# Patient Record
Sex: MALE | Race: WHITE | Employment: UNEMPLOYED | URBAN - NONMETROPOLITAN AREA
[De-identification: names, ages, dates, MRNs, and addresses within clinical notes are randomized per-mention and may not be internally consistent; named-entity substitution may affect disease eponyms.]

---

## 2017-04-06 ENCOUNTER — OFFICE VISIT (OUTPATIENT)
Dept: PULMONOLOGY | Age: 62
End: 2017-04-06

## 2017-04-06 VITALS
TEMPERATURE: 97.4 F | HEART RATE: 72 BPM | DIASTOLIC BLOOD PRESSURE: 64 MMHG | WEIGHT: 299 LBS | SYSTOLIC BLOOD PRESSURE: 128 MMHG | BODY MASS INDEX: 36.41 KG/M2 | OXYGEN SATURATION: 97 % | RESPIRATION RATE: 18 BRPM | HEIGHT: 76 IN

## 2017-04-06 DIAGNOSIS — G47.10 HYPERSOMNIA: ICD-10-CM

## 2017-04-06 DIAGNOSIS — G47.33 OSA TREATED WITH BIPAP: ICD-10-CM

## 2017-04-06 DIAGNOSIS — J44.9 COPD, SEVERE (HCC): Primary | ICD-10-CM

## 2017-04-06 DIAGNOSIS — R91.1 LUNG NODULE: ICD-10-CM

## 2017-04-06 PROBLEM — G47.30 SLEEP APNEA: Status: ACTIVE | Noted: 2017-04-06

## 2017-04-06 PROCEDURE — 99215 OFFICE O/P EST HI 40 MIN: CPT | Performed by: INTERNAL MEDICINE

## 2017-04-06 RX ORDER — METOPROLOL SUCCINATE 100 MG/1
100 TABLET, EXTENDED RELEASE ORAL DAILY
COMMUNITY

## 2017-07-11 DIAGNOSIS — J44.9 COPD, SEVERE (HCC): ICD-10-CM

## 2017-07-11 DIAGNOSIS — R93.89 ABNORMAL CT SCAN, CHEST: ICD-10-CM

## 2017-07-11 DIAGNOSIS — R91.1 INCIDENTAL LUNG NODULE, GREATER THAN OR EQUAL TO 8MM: ICD-10-CM

## 2017-07-20 ENCOUNTER — OFFICE VISIT (OUTPATIENT)
Dept: PULMONOLOGY | Age: 62
End: 2017-07-20
Payer: COMMERCIAL

## 2017-07-20 VITALS
HEART RATE: 94 BPM | SYSTOLIC BLOOD PRESSURE: 116 MMHG | HEIGHT: 76 IN | OXYGEN SATURATION: 97 % | BODY MASS INDEX: 38.36 KG/M2 | DIASTOLIC BLOOD PRESSURE: 70 MMHG | WEIGHT: 315 LBS | TEMPERATURE: 98.2 F | RESPIRATION RATE: 19 BRPM

## 2017-07-20 DIAGNOSIS — J44.9 STAGE 3 SEVERE COPD BY GOLD CLASSIFICATION (HCC): Primary | ICD-10-CM

## 2017-07-20 DIAGNOSIS — Z72.0 TOBACCO ABUSE: ICD-10-CM

## 2017-07-20 PROCEDURE — 99214 OFFICE O/P EST MOD 30 MIN: CPT | Performed by: INTERNAL MEDICINE

## 2017-07-20 RX ORDER — LOSARTAN POTASSIUM 50 MG/1
50 TABLET ORAL DAILY
COMMUNITY

## 2017-07-20 RX ORDER — ATORVASTATIN CALCIUM 10 MG/1
10 TABLET, FILM COATED ORAL DAILY
COMMUNITY

## 2018-03-22 ENCOUNTER — OFFICE VISIT (OUTPATIENT)
Dept: PULMONOLOGY | Age: 63
End: 2018-03-22
Payer: COMMERCIAL

## 2018-03-22 VITALS
WEIGHT: 315 LBS | OXYGEN SATURATION: 98 % | BODY MASS INDEX: 38.36 KG/M2 | TEMPERATURE: 97.8 F | SYSTOLIC BLOOD PRESSURE: 118 MMHG | HEART RATE: 85 BPM | DIASTOLIC BLOOD PRESSURE: 72 MMHG | HEIGHT: 76 IN

## 2018-03-22 DIAGNOSIS — J44.9 STAGE 3 SEVERE COPD BY GOLD CLASSIFICATION (HCC): ICD-10-CM

## 2018-03-22 DIAGNOSIS — J44.9 STAGE 3 SEVERE COPD BY GOLD CLASSIFICATION (HCC): Primary | ICD-10-CM

## 2018-03-22 PROCEDURE — 3017F COLORECTAL CA SCREEN DOC REV: CPT | Performed by: INTERNAL MEDICINE

## 2018-03-22 PROCEDURE — G8926 SPIRO NO PERF OR DOC: HCPCS | Performed by: INTERNAL MEDICINE

## 2018-03-22 PROCEDURE — 99999 6 MIN WALK TEST: CPT | Performed by: INTERNAL MEDICINE

## 2018-03-22 PROCEDURE — G8428 CUR MEDS NOT DOCUMENT: HCPCS | Performed by: INTERNAL MEDICINE

## 2018-03-22 PROCEDURE — 3023F SPIROM DOC REV: CPT | Performed by: INTERNAL MEDICINE

## 2018-03-22 PROCEDURE — 94618 PULMONARY STRESS TESTING: CPT | Performed by: INTERNAL MEDICINE

## 2018-03-22 PROCEDURE — G8417 CALC BMI ABV UP PARAM F/U: HCPCS | Performed by: INTERNAL MEDICINE

## 2018-03-22 PROCEDURE — 99214 OFFICE O/P EST MOD 30 MIN: CPT | Performed by: INTERNAL MEDICINE

## 2018-03-22 PROCEDURE — 1036F TOBACCO NON-USER: CPT | Performed by: INTERNAL MEDICINE

## 2018-03-22 PROCEDURE — G8484 FLU IMMUNIZE NO ADMIN: HCPCS | Performed by: INTERNAL MEDICINE

## 2018-03-22 NOTE — PROGRESS NOTES
daily      losartan (COZAAR) 50 MG tablet Take 50 mg by mouth daily      metoprolol succinate (TOPROL XL) 100 MG extended release tablet Take 100 mg by mouth daily      metFORMIN (GLUCOPHAGE) 500 MG tablet Take 500 mg by mouth 2 times daily (with meals)      IRON PO Take by mouth      SYMBICORT 160-4.5 MCG/ACT AERO INHALE TWO PUFFS BY MOUTH INTO THE LUNGS TWICE DAILY 1 Inhaler 11    dabigatran (PRADAXA) 150 MG capsule Take 150 mg by mouth 2 times daily      omeprazole (PRILOSEC) 20 MG capsule Take 20 mg by mouth daily      diltiazem (CARDIZEM CD) 120 MG ER capsule Take 240 mg by mouth 2 times daily       magnesium oxide (MAG-OX) 400 MG tablet Take 400 mg by mouth daily      gabapentin (NEURONTIN) 100 MG capsule Take 100 mg by mouth 3 times daily      OXYGEN Inhale 2-4 L into the lungs daily 2L during am, 4L qhs      Roflumilast (DALIRESP) 500 MCG tablet Take 500 mcg by mouth daily 30 tablet 11    tiotropium (SPIRIVA) 18 MCG inhalation capsule Inhale 18 mcg into the lungs daily      albuterol sulfate  (90 BASE) MCG/ACT inhaler Inhale 2 puffs into the lungs every 6 hours as needed for Wheezing      buPROPion (WELLBUTRIN) 100 MG tablet Take 100 mg by mouth 2 times daily       No current facility-administered medications for this visit. No family history on file. Physical Exam     VITALS:    /72   Pulse 85   Temp 97.8 °F (36.6 °C)   Ht 6' 4\" (1.93 m)   Wt (!) 318 lb (144.2 kg)   SpO2 98% Comment: 4L/02 at rest  BMI 38.71 kg/m²   Mallampati airway Class:4  Neck Circumference:.19 Inches  [place] sleepiness score 3/22/18:5     Nursing note and vitals reviewed. Constitutional: Patient appears well built and well nourished. No distress. Patient is oriented to person, place, and time. HENT:   Head: Normocephalic and atraumatic. Right Ear: External ear normal.   Left Ear: External ear normal.   Mouth/Throat: Oropharynx is clear and moist.  No oral thrush.   Eyes: Conjunctivae

## 2018-06-26 DIAGNOSIS — J44.9 STAGE 3 SEVERE COPD BY GOLD CLASSIFICATION (HCC): ICD-10-CM

## 2018-06-26 DIAGNOSIS — Z72.0 TOBACCO ABUSE: ICD-10-CM

## 2018-07-24 NOTE — PROGRESS NOTES
for pulmonary rehab. He was referred to Dr. Joan Ferrer, CT surgeon at [facility] Cardiothoracic surgery department for further evaluation and management of his severe COPD with decreaing lung function for LVRS Vs lung transplant. He quit smoking 2years back. How ever he did not want to go any referral. He verbalizes understanding of consequences of his decision. Social History: Occupation: He used to work as a . He is currently not working      Patient admits tohistory of tobacco smoking. He used to smoke 2  PPD for 40 Years. He quit smoking in 06/26/15. He denies history of exposure to coal, foundry, [employer], asbestos or significant farm dust exposure. Patient is a . Patient denies history of exposure to birds, pigeons, or chickens in the past. The patient admits toto pet animals at home. Hecurrently had 3dogs, 1 [place] pig and 4 cats at home. He denies to history of recreational or IV drug use. Hedenies history of pulmonary embolism or DVT in the past.      Review of Systems   General/Constitutional: he gained 1lb of weight from the last visit with normal appetite. No fever or chills. HENT: Negative. Eyes: Negative. Upper respiratory tract: No nasal stuffiness or post nasal drip. Lower respiratory tract/ lungs: No cough or sputum production. No hemoptysis. Cardiovascular: No palpitations or chest pain. Gastrointestinal: No nausea or vomiting. Neurological: No focal neurologiacal weakness. Extremities: No edema. Musculoskeletal: No complaints. Genitourinary: No complaints. Hematological: Negative. Psychiatric/Behavioral: Negative. Skin: No itching.           Current Medications:          Past Medical History:   Diagnosis Date    Alkaline reflux esophagitis     Congenital hiatus hernia     [facility])     Disorder of lipoid metabolism     DM2 (diabetes mellitus, type 2) ([facility] [address]     Hypertension facility-administered medications for this visit. No family history on file. Physical Exam     VITALS:    /70 (Site: Left Arm, Position: Sitting, Cuff Size: Large Adult)   Pulse 72   Temp 97.2 °F (36.2 °C) Comment: Tympanic  Resp 24   Ht 6' 4\" (1.93 m)   Wt (!) 317 lb (143.8 kg)   SpO2 96% Comment: on 3 L  BMI 38.59 kg/m²   Mallampati airway Class:  IV  Neck Circumference:  19 Inches  [place] sleepiness score 7/26/18: 3. Constitutional: Patient appears well built and well nourished. No distress. Patient is oriented to person, place, and time. HENT:   Head: Normocephalic and atraumatic. Right Ear: External ear normal.   Left Ear: External ear normal.   Mouth/Throat: Oropharynx is clear and moist.  No oral thrush. Eyes: Conjunctivae are normal. Pupils are equal, round, and reactive to light. No scleral icterus. Neck: Neck supple. No JVD present. No tracheal deviation present. Cardiovascular: Normal rate, regular rhythm, normal heart sounds. No murmur heard. Pulmonary/Chest: Effort normal and breath sounds normal. No stridor. No respiratory distress. No wheezes. No rales. Patient exhibits no tenderness. Diminished breath sounds on both sides. Abdominal: Soft. Patient exhibits no distension. No tenderness. Musculoskeletal: Normal range of motion. Extremities: Patient exhibits no edema and no tenderness. Lymphadenopathy:  No cervical adenopathy. Neurological: Patient is alert and oriented to person, place, and time. Skin: Skin is warm and dry. Patient is not diaphoretic. Psychiatric: Patient  has a normal mood and affect. Patient behavior is normal.       Diagnostic Data:    Echocardiogram: 2016                    Nocturnal pulse ox study: 6/7/16                    CT chest:          Serum [id number] level: 12/29/15                PET scan:  Jul 11, 2016  PROCEDURE: PET CT SKULL BASE TO MID THIGH  1.  No significant metabolic activity in a left upper lobe subpleural density, likely nodular scarring. 2. Chronic lung disease. Split night sleep test:2016                   Spirometry 2017: 12/19/16                 His FEV1 improved from previous spirometry done in the past.       CT chest: 7/10/17             Six Minute Walk Test done on 3/22/18 at 10:21 AM    Clive Morris 1955    Six minute walk test done in my office today by my medical assistant Miss. Aparicio: Jeremiah's oxygen saturation at rest on room air was 93%. His oxygen saturation dropped to 84% on room air with exertion after 2 minute 0 seconds. The six minute walk test was repeated with oxygen supplementation. Oxygen supplimentation was started with 2LPM via nasal cannula and titrated to 3 LPM via nasal cannula. At the end of the test Jeremiah Loredo's oxygen saturation remained at 93% on 3LPM with exertion. He is mobile in the home and requires oxygen as outlined above. Clive Morris needs no home O2 at rest. He needs 3LPM of home O2 with exercise. DME Medical Necessity Documentation    Patient was seen in my clinic on 3/22/18 for the diagnosis COPD. I am prescribing oxygen because the diagnosis and testing requires the patient to have oxygen in the home. Condition will improve or be benefited by oxygen use. The patient is  able to perform good mobility in a home setting and therefore does require the use of a portable oxygen system. Diagnostic Data: 7-2-13  AHI 5  PAP Download:    Recorded compliance dates:  6-16-18 -  7-15-18  More than 4hour usage compliance was:  100%. Average residual Apnea- Hypoapnea index on current pressue was:0.5        PAP Type Bi PAP   Level  17/21.0 cmH2O     Average usuage hours per day was: 10 hrs 52 min 24 sec     Interface: FFM     Provider:  []-ANALISA                 []Carly                []Edward  [x]Kristi                               [][facility]          []Other:       CT chest 2018: He refused to have any more CT scans in future.  He is worried about having too much radiation exposure and associated risk. He is aware of the consequences if his decision including but not limited to missing/non diagnosis of neoplastic process and associated co morbidity leading to early death. Assessment:  -Severe COPD- under control with his current treatment.  -Severe obstructive sleep apnea on treatment with a BiPAP with pressures of 21/17cm H20 along with 1LPM of O2 bleed in. He is using his CPAP with excellent compliance. He denies any clinical symptoms.  -Abnormal CT chest with 11/18 mm left upper lobe linear opacity with hx of tobacco- He refused to have any more CTchests. The nodule is stable for 8months with initial CT scan of chest dated 10/12/15.  -Congestive heart failure with systolic dysfunction. He follows with Dr. Tarun Dee in [facility]  -Hypertension under control.  -Peripheral vascular disease ([facility] [address] -Hx of Tobacco abuse. He quit smoking in 06/2015. Recommendations/Plan:  -Continue Symbicort 160/4.5mcg spray MDI, 2 puffs via inhalation BID. Refills given  -Continue Spiriva 18mcg/Cap DPI. 1Cap via inhalation daily.   -Continue Daliresp 500mcg 1 tab po daily.   -Continue Albuterol HFA 90mcg/Spray MDI, 2puffs Q6Hprn.  Alice Paniagua advised to continue prescribed inhalers and keep good compliance. He verbalizes understanding.   - Patient educated to update his pneumococcal vaccine with family physician and take influenza vaccine in coming season with out fail. Alice Paniagua verbalizes understanding.   -Alice Paniagua needs no home O2 at rest. He needs 3LPM of home O2 with exercise and 1LPM of home oxygen at night time bleed in with his BiPAP   -Alice Paniagua was advised to make early appointment with my clinic if he develops any constitutional symptoms including loss of weight, poor appetite or hemoptysis. He verbalizes under standing.  - Schedule patient for follow up with my clinic in 1year with spirometry and down load from his BiPAP.  Patient

## 2018-07-26 ENCOUNTER — OFFICE VISIT (OUTPATIENT)
Dept: PULMONOLOGY | Age: 63
End: 2018-07-26
Payer: COMMERCIAL

## 2018-07-26 VITALS
DIASTOLIC BLOOD PRESSURE: 70 MMHG | SYSTOLIC BLOOD PRESSURE: 110 MMHG | WEIGHT: 315 LBS | RESPIRATION RATE: 24 BRPM | BODY MASS INDEX: 38.36 KG/M2 | OXYGEN SATURATION: 96 % | TEMPERATURE: 97.2 F | HEIGHT: 76 IN | HEART RATE: 72 BPM

## 2018-07-26 DIAGNOSIS — G47.33 OSA TREATED WITH BIPAP: Primary | ICD-10-CM

## 2018-07-26 DIAGNOSIS — J44.9 COPD, SEVERE (HCC): ICD-10-CM

## 2018-07-26 PROCEDURE — G8417 CALC BMI ABV UP PARAM F/U: HCPCS | Performed by: INTERNAL MEDICINE

## 2018-07-26 PROCEDURE — 3023F SPIROM DOC REV: CPT | Performed by: INTERNAL MEDICINE

## 2018-07-26 PROCEDURE — 1036F TOBACCO NON-USER: CPT | Performed by: INTERNAL MEDICINE

## 2018-07-26 PROCEDURE — G8427 DOCREV CUR MEDS BY ELIG CLIN: HCPCS | Performed by: INTERNAL MEDICINE

## 2018-07-26 PROCEDURE — G8926 SPIRO NO PERF OR DOC: HCPCS | Performed by: INTERNAL MEDICINE

## 2018-07-26 PROCEDURE — 99214 OFFICE O/P EST MOD 30 MIN: CPT | Performed by: INTERNAL MEDICINE

## 2018-07-26 PROCEDURE — 3017F COLORECTAL CA SCREEN DOC REV: CPT | Performed by: INTERNAL MEDICINE

## 2018-07-26 RX ORDER — BUDESONIDE AND FORMOTEROL FUMARATE DIHYDRATE 160; 4.5 UG/1; UG/1
2 AEROSOL RESPIRATORY (INHALATION) 2 TIMES DAILY
Qty: 1 INHALER | Refills: 11 | Status: SHIPPED | OUTPATIENT
Start: 2018-07-26 | End: 2019-07-25 | Stop reason: ALTCHOICE

## 2018-07-26 RX ORDER — PENTOXIFYLLINE 400 MG/1
400 TABLET, EXTENDED RELEASE ORAL 2 TIMES DAILY
COMMUNITY

## 2019-07-15 NOTE — PROGRESS NOTES
He verbalizes understanding of consequences of his decision. Social History: Occupation: He used to work as a . He is currently not working      Patient admits tohistory of tobacco smoking. He used to smoke 2  PPD for 40 Years. He quit smoking in 06/26/15. He denies history of exposure to coal, foundry, [employer], asbestos or significant farm dust exposure. Patient is a . Patient denies history of exposure to birds, pigeons, or chickens in the past. The patient admits toto pet animals at home. Hecurrently had 3dogs, 1 [place] pig and 4 cats at home. He denies to history of recreational or IV drug use. Hedenies history of pulmonary embolism or DVT in the past.      Review of Systems:   General/Constitutional: he gained 12lbs of weight from the last visit with normal appetite. No fever or chills. HENT: Negative. Eyes: Negative. Upper respiratory tract: No nasal stuffiness or post nasal drip. Lower respiratory tract/ lungs: See HPI. No hemoptysis. Cardiovascular: No palpitations or chest pain. Gastrointestinal: No nausea or vomiting. Neurological: No focal neurologiacal weakness. Extremities: No edema. Musculoskeletal: No complaints. Genitourinary: No complaints. Hematological: Negative. Psychiatric/Behavioral: Negative. Skin: No itching.     Current Medications:      Past Medical History:   Diagnosis Date    Alkaline reflux esophagitis     Congenital hiatus hernia     COPD (chronic obstructive pulmonary disease) (HCC)     Disorder of lipoid metabolism     DM2 (diabetes mellitus, type 2) (HCC)     Hypertension     Mass of lung     Obstructive chronic bronchitis without exacerbation (HCC)     Peripheral vascular disease (HCC)     Reflux esophagitis     Tobacco abuse        Past Surgical History:   Procedure Laterality Date    CARDIAC CATHETERIZATION      OTHER SURGICAL HISTORY  8/2015    Atrial flutter ablation       No Known Allergies    Current Outpatient my office today by my medical assistant Miss. Aparicio: Jeremiah's oxygen saturation at rest on room air was 93%. His oxygen saturation dropped to 84% on room air with exertion after 2 minute 0 seconds. The six minute walk test was repeated with oxygen supplementation. Oxygen supplimentation was started with 2LPM via nasal cannula and titrated to 3 LPM via nasal cannula. At the end of the test Jeremiah Loredo's oxygen saturation remained at 93% on 3LPM with exertion. He is mobile in the home and requires oxygen as outlined above. Laury Posadas needs no home O2 at rest. He needs 3LPM of home O2 with exercise. DME Medical Necessity Documentation    Patient was seen in my clinic on 3/22/18 for the diagnosis COPD. I am prescribing oxygen because the diagnosis and testing requires the patient to have oxygen in the home. Condition will improve or be benefited by oxygen use. The patient is  able to perform good mobility in a home setting and therefore does require the use of a portable oxygen system. Diagnostic Data: 7-2-13  AHI 5  PAP Download:    Recorded compliance dates:  6-24-19 -  7-23-19  More than 4hour usage compliance was:  96.7%. Average residual Apnea- Hypoapnea index on current pressue was 0.1       PAP Type Bi PAP        Level  17/21 cmH2O     Average usuage hours per day was: 12 hrs 31 min 46 sec     Interface:  Full face     Provider:  []JOE                 []Carly                []Edward  [x]Kristi                               [][facility]          []Other:       CT chest 2018: He refused to have any more CT scans in future. He is worried about having too much radiation exposure and associated risk. He is aware of the consequences if his decision including but not limited to missing/non diagnosis of neoplastic process and associated co morbidity leading to early death.     Spirometry: 7/16/19                      Assessment:  -Severe COPD- under control with his current

## 2019-07-22 DIAGNOSIS — G47.33 OSA TREATED WITH BIPAP: ICD-10-CM

## 2019-07-22 DIAGNOSIS — J44.9 COPD, SEVERE (HCC): ICD-10-CM

## 2019-07-25 ENCOUNTER — TELEPHONE (OUTPATIENT)
Dept: PULMONOLOGY | Age: 64
End: 2019-07-25

## 2019-07-25 ENCOUNTER — OFFICE VISIT (OUTPATIENT)
Dept: PULMONOLOGY | Age: 64
End: 2019-07-25
Payer: COMMERCIAL

## 2019-07-25 VITALS
BODY MASS INDEX: 38.36 KG/M2 | OXYGEN SATURATION: 91 % | TEMPERATURE: 97.4 F | HEART RATE: 90 BPM | RESPIRATION RATE: 22 BRPM | WEIGHT: 315 LBS | DIASTOLIC BLOOD PRESSURE: 84 MMHG | HEIGHT: 76 IN | SYSTOLIC BLOOD PRESSURE: 128 MMHG

## 2019-07-25 DIAGNOSIS — J44.9 STAGE 3 SEVERE COPD BY GOLD CLASSIFICATION (HCC): Primary | ICD-10-CM

## 2019-07-25 DIAGNOSIS — R91.1 INCIDENTAL LUNG NODULE, GREATER THAN OR EQUAL TO 8MM: ICD-10-CM

## 2019-07-25 DIAGNOSIS — J44.9 COPD, SEVERE (HCC): ICD-10-CM

## 2019-07-25 DIAGNOSIS — R93.89 ABNORMAL CT SCAN, CHEST: ICD-10-CM

## 2019-07-25 DIAGNOSIS — G47.33 OSA TREATED WITH BIPAP: ICD-10-CM

## 2019-07-25 PROCEDURE — 3017F COLORECTAL CA SCREEN DOC REV: CPT | Performed by: INTERNAL MEDICINE

## 2019-07-25 PROCEDURE — G8417 CALC BMI ABV UP PARAM F/U: HCPCS | Performed by: INTERNAL MEDICINE

## 2019-07-25 PROCEDURE — 3023F SPIROM DOC REV: CPT | Performed by: INTERNAL MEDICINE

## 2019-07-25 PROCEDURE — G8427 DOCREV CUR MEDS BY ELIG CLIN: HCPCS | Performed by: INTERNAL MEDICINE

## 2019-07-25 PROCEDURE — G8926 SPIRO NO PERF OR DOC: HCPCS | Performed by: INTERNAL MEDICINE

## 2019-07-25 PROCEDURE — 1036F TOBACCO NON-USER: CPT | Performed by: INTERNAL MEDICINE

## 2019-07-25 PROCEDURE — 99215 OFFICE O/P EST HI 40 MIN: CPT | Performed by: INTERNAL MEDICINE

## 2019-07-25 RX ORDER — ALBUTEROL SULFATE 90 UG/1
2 AEROSOL, METERED RESPIRATORY (INHALATION) EVERY 6 HOURS PRN
Qty: 1 INHALER | Refills: 11 | Status: SHIPPED | OUTPATIENT
Start: 2019-07-25 | End: 2021-07-28 | Stop reason: SDUPTHER

## 2019-11-27 ENCOUNTER — OFFICE VISIT (OUTPATIENT)
Dept: PULMONOLOGY | Age: 64
End: 2019-11-27
Payer: COMMERCIAL

## 2019-11-27 VITALS
DIASTOLIC BLOOD PRESSURE: 82 MMHG | BODY MASS INDEX: 38.36 KG/M2 | HEIGHT: 76 IN | OXYGEN SATURATION: 98 % | HEART RATE: 76 BPM | WEIGHT: 315 LBS | SYSTOLIC BLOOD PRESSURE: 128 MMHG | TEMPERATURE: 96.7 F

## 2019-11-27 DIAGNOSIS — I48.91 ATRIAL FIBRILLATION, UNSPECIFIED TYPE (HCC): ICD-10-CM

## 2019-11-27 DIAGNOSIS — E66.01 CLASS 3 SEVERE OBESITY DUE TO EXCESS CALORIES WITH SERIOUS COMORBIDITY AND BODY MASS INDEX (BMI) OF 40.0 TO 44.9 IN ADULT (HCC): ICD-10-CM

## 2019-11-27 DIAGNOSIS — R06.02 SHORTNESS OF BREATH: ICD-10-CM

## 2019-11-27 DIAGNOSIS — R91.8 LUNG NODULES: ICD-10-CM

## 2019-11-27 DIAGNOSIS — G47.33 OSA TREATED WITH BIPAP: ICD-10-CM

## 2019-11-27 DIAGNOSIS — J44.9 STAGE 3 SEVERE COPD BY GOLD CLASSIFICATION (HCC): Primary | ICD-10-CM

## 2019-11-27 DIAGNOSIS — Z87.891 PERSONAL HISTORY OF TOBACCO USE: ICD-10-CM

## 2019-11-27 DIAGNOSIS — J96.11 CHRONIC RESPIRATORY FAILURE WITH HYPOXIA (HCC): ICD-10-CM

## 2019-11-27 DIAGNOSIS — R53.81 PHYSICAL DECONDITIONING: ICD-10-CM

## 2019-11-27 PROCEDURE — G0296 VISIT TO DETERM LDCT ELIG: HCPCS | Performed by: NURSE PRACTITIONER

## 2019-11-27 PROCEDURE — G8427 DOCREV CUR MEDS BY ELIG CLIN: HCPCS | Performed by: NURSE PRACTITIONER

## 2019-11-27 PROCEDURE — 3017F COLORECTAL CA SCREEN DOC REV: CPT | Performed by: NURSE PRACTITIONER

## 2019-11-27 PROCEDURE — G8484 FLU IMMUNIZE NO ADMIN: HCPCS | Performed by: NURSE PRACTITIONER

## 2019-11-27 PROCEDURE — G8417 CALC BMI ABV UP PARAM F/U: HCPCS | Performed by: NURSE PRACTITIONER

## 2019-11-27 PROCEDURE — 99214 OFFICE O/P EST MOD 30 MIN: CPT | Performed by: NURSE PRACTITIONER

## 2019-11-27 PROCEDURE — 3023F SPIROM DOC REV: CPT | Performed by: NURSE PRACTITIONER

## 2019-11-27 PROCEDURE — 1036F TOBACCO NON-USER: CPT | Performed by: NURSE PRACTITIONER

## 2019-11-27 PROCEDURE — G8926 SPIRO NO PERF OR DOC: HCPCS | Performed by: NURSE PRACTITIONER

## 2020-07-09 ENCOUNTER — TELEPHONE (OUTPATIENT)
Dept: PULMONOLOGY | Age: 65
End: 2020-07-09

## 2020-07-22 ENCOUNTER — OFFICE VISIT (OUTPATIENT)
Dept: PULMONOLOGY | Age: 65
End: 2020-07-22
Payer: COMMERCIAL

## 2020-07-22 VITALS
SYSTOLIC BLOOD PRESSURE: 130 MMHG | BODY MASS INDEX: 38.36 KG/M2 | OXYGEN SATURATION: 98 % | DIASTOLIC BLOOD PRESSURE: 82 MMHG | HEIGHT: 76 IN | WEIGHT: 315 LBS | HEART RATE: 84 BPM

## 2020-07-22 PROCEDURE — 99214 OFFICE O/P EST MOD 30 MIN: CPT | Performed by: NURSE PRACTITIONER

## 2020-07-22 NOTE — PROGRESS NOTES
issues? No  New ER or hospitlal visits? No  Any new or changes in medicines? No  Any new sleep medicines?  No      Past Medical History:   Diagnosis Date    Alkaline reflux esophagitis     Congenital hiatus hernia     COPD (chronic obstructive pulmonary disease) (HCC)     Disorder of lipoid metabolism     DM2 [facility]di[facility] mellitus, type 2) ([facility] [address]     Hypertension     Mass of lung     Obstructive chronic bronchitis without exacerbation (HCC)     LEV treated with BiPAP     Peripheral vascular disease (HCC)     Reflux esophagitis     Tobacco abuse        Past Surgical History:   Procedure Laterality Date    CARDIAC CATHETERIZATION      OTHER SURGICAL HISTORY  2015    Atrial flutter ablation       Social History     Tobacco Use    Smoking status: Former Smoker     Packs/day: 2.00     Years: 40.00     Pack years: 80.00     Types: Cigarettes     Last attempt to quit: 2015     Years since quittin.0    Smokeless tobacco: Never Used   Substance Use Topics    Alcohol use: No     Alcohol/week: 0.0 standard drinks    Drug use: No       No Known Allergies    Current Outpatient Medications   Medication Sig Dispense Refill    Fluticasone-Umeclidin-Vilant (TRELEGY ELLIPTA) 100-62.5-25 MCG/INH AEPB Inhale 1 puff into the lungs daily 30 each 11    Roflumilast (DALIRESP) 500 MCG tablet Take 1 tablet by mouth daily 30 tablet 11    albuterol sulfate  (90 Base) MCG/ACT inhaler Inhale 2 puffs into the lungs every 6 hours as needed for Wheezing 1 Inhaler 11    pentoxifylline (TRENTAL) 400 MG extended release tablet Take 400 mg by mouth 2 times daily      insulin glargine (BASAGLAR KWIKPEN) 100 UNIT/ML injection pen Inject into the skin nightly      UNABLE TO FIND pts on insulin not sure of the name of it      atorvastatin (LIPITOR) 10 MG tablet Take 10 mg by mouth daily      losartan (COZAAR) 50 MG tablet Take 50 mg by mouth daily      metoprolol succinate (TOPROL XL) 100 MG extended release tablet Take 100 mg by mouth daily      metFORMIN (GLUCOPHAGE) 500 MG tablet Take 500 mg by mouth 2 times daily (with meals)      IRON PO Take by mouth      dabigatran (PRADAXA) 150 MG capsule Take 150 mg by mouth 2 times daily      diltiazem (CARDIZEM CD) 120 MG ER capsule Take 240 mg by mouth 2 times daily       OXYGEN Inhale 2-4 L into the lungs daily 2L during am, 4L qhs      buPROPion (WELLBUTRIN) 100 MG tablet Take 100 mg by mouth 2 times daily       No current facility-administered medications for this visit. History reviewed. No pertinent family history. Review of Systems   General/Constitutional: No recent loss of weight or appetite changes. No fever or chills. HENT: Negative. Eyes: Negative. Upper respiratory tract: No nasal stuffiness or post nasal drip. Lower respiratory tract/ lungs: No cough or sputum production. No hemoptysis. Cardiovascular: No palpitations or chest pain. Gastrointestinal: No nausea or vomiting. Neurological: No focal neurologiacal weakness. Extremities: No edema. Musculoskeletal: No complaints. Genitourinary: No complaints. Hematological: Negative. Psychiatric/Behavioral: Negative. Skin: No itching. Physical Exam:    BMI: Body mass index is 40.61 kg/m². Wt Readings from Last 3 Encounters:   07/22/20 (!) 333 lb 9.6 oz (151.3 kg)   11/27/19 (!) 331 lb 9.6 oz (150.4 kg)   07/25/19 (!) 329 lb 12.8 oz (149.6 kg)     Weight stable / unchanged  Vitals: /82 (Site: Left Upper Arm, Position: Sitting, Cuff Size: Large Adult)   Pulse 84   Ht 6' 4\" (1.93 m)   Wt (!) 333 lb 9.6 oz (151.3 kg)   SpO2 98% Comment: on 3 LPM  BMI 40.61 kg/m²   On 4 Liters    General Appearance - Moderately built, moderately nourished, in no acute distress. HEENT - Head is normocephalic, atraumatic. PERRL. Oral mucosa pink and moist, no oral thrush. Mallampati Score - IV (only hard palate visible). Neck - Supple, symmetrical, trachea midline and soft.   Lungs - Diminished but clear to auscultation, no wheezes, rales or rhonchi. Cardiovascular - Heart sounds are normal. Regular rhythm normal rate, + murmur, no gallop or rub. Abdomen - Soft, nontender, non-distended. Neurologic - Alert and oriented x 3. Skin - No bruising or bleeding. Extremities - No cyanosis, clubbing or edema. ASSESSMENT/DIAGNOSIS     Diagnosis Orders   1. LEV treated with BiPAP  DME Order for CPAP as OP   2. Stage 3 severe COPD by GOLD classification ([address]     3. Chronic respiratory failure with hypoxia (HCC)     4. Lung nodules              Plan   Do you need any equipment today? Yes Rx for new supplies.    - He was advised to continue current positive airway pressure therapy with above described pressure. - He was advised to keep good compliance with current recommended pressure to get optimal results and clinical improvement.  - Recommend 7-9 hours of sleep with PAP treatment. - He was advised to call DME company regarding supplies if needed.   -He is to call my office for earlier appointment if needed for worsening of sleep symptoms.   - He was instructed on weight loss. Reviewed Spirometry results. Continue Trelegy and SERENA PRN. Continue Daliresp. Continue 02 at 3-4 Liters with activity. Continue BiPAP with 1 Liter 02 bleed. With COVID, he refuses CT Lung screening: Will call if changes mind. Refuses pulmonary rehab. PCP follow up for flu vaccine and PNA vaccines.     - Matty Salmon was educated about my impression and plan and verbalizes understanding. We will see Jacques Ovalle back in: 1 year with download. Follow up 6 months for Pulmonary.     Electronically signed by SURY Flores CNP on 7/22/2020 at 6:28 PM

## 2020-07-29 RX ORDER — FLUTICASONE FUROATE, UMECLIDINIUM BROMIDE AND VILANTEROL TRIFENATATE 100; 62.5; 25 UG/1; UG/1; UG/1
1 POWDER RESPIRATORY (INHALATION) DAILY
Qty: 30 EACH | Refills: 11 | Status: SHIPPED | OUTPATIENT
Start: 2020-07-29 | End: 2021-07-28 | Stop reason: SDUPTHER

## 2020-07-29 NOTE — TELEPHONE ENCOUNTER
Amol Riojas called requesting a refill on the following medications:  Requested Prescriptions     Pending Prescriptions Disp Refills    fluticasone-umeclidin-vilant (TRELEGY ELLIPTA) 100-62.5-25 MCG/INH AEPB 30 each 11     Sig: Inhale 1 puff into the lungs daily     Pharmacy verified:  [facility] [address]    Date of last visit: 07/22/2020  Date of next visit (if applicable): 52/42/8319    Patient is also needing a refill for his emergency/rescue inhaler, unable to verify name & dosage, wife said he threw it away.

## 2020-08-12 ENCOUNTER — TELEPHONE (OUTPATIENT)
Dept: PULMONOLOGY | Age: 65
End: 2020-08-12

## 2020-08-12 RX ORDER — ROFLUMILAST 500 UG/1
TABLET ORAL
Qty: 30 TABLET | Refills: 11 | Status: SHIPPED | OUTPATIENT
Start: 2020-08-12 | End: 2021-01-27

## 2020-08-18 NOTE — TELEPHONE ENCOUNTER
[facility] faxed over a message needing an order for CT Lung screen, Approval was received to today from Fabian Quesada.

## 2021-01-27 ENCOUNTER — OFFICE VISIT (OUTPATIENT)
Dept: PULMONOLOGY | Age: 66
End: 2021-01-27
Payer: COMMERCIAL

## 2021-01-27 VITALS
BODY MASS INDEX: 38.36 KG/M2 | SYSTOLIC BLOOD PRESSURE: 110 MMHG | WEIGHT: 315 LBS | HEART RATE: 60 BPM | TEMPERATURE: 97.4 F | OXYGEN SATURATION: 95 % | HEIGHT: 76 IN | DIASTOLIC BLOOD PRESSURE: 60 MMHG

## 2021-01-27 DIAGNOSIS — J44.9 STAGE 3 SEVERE COPD BY GOLD CLASSIFICATION (HCC): Primary | ICD-10-CM

## 2021-01-27 DIAGNOSIS — R91.8 LUNG NODULES: ICD-10-CM

## 2021-01-27 DIAGNOSIS — G47.33 OSA TREATED WITH BIPAP: ICD-10-CM

## 2021-01-27 DIAGNOSIS — Z87.891 PERSONAL HISTORY OF TOBACCO USE: ICD-10-CM

## 2021-01-27 DIAGNOSIS — J96.11 CHRONIC RESPIRATORY FAILURE WITH HYPOXIA (HCC): ICD-10-CM

## 2021-01-27 DIAGNOSIS — J41.0 SIMPLE CHRONIC BRONCHITIS (HCC): ICD-10-CM

## 2021-01-27 PROCEDURE — 99214 OFFICE O/P EST MOD 30 MIN: CPT | Performed by: NURSE PRACTITIONER

## 2021-01-27 PROCEDURE — G0296 VISIT TO DETERM LDCT ELIG: HCPCS | Performed by: NURSE PRACTITIONER

## 2021-01-27 NOTE — PROGRESS NOTES
[place] [facility]     Patient: Bacilio Faust, 63 y.o.   : 1955   Pt of Dr. Jermaine Bee   Patient presents with    Follow-up     Last office visit was 19 for Pulmonary. GERRI Clark Joe is here for 6 month follow up for COPD. 80 pack year smoking history, quitting in . A1A MM. Most recent spirometry showed improved findings: FEV1 47, FEV/FVC 76 (no BD response). Known 1.2 mm MYRNA lung nodule stable since , noted to have decrease in size in 2017 but with 2 new sub-centimeter nodules. Negative PET CT in 2016. Has refused any additional follow up imaging due to risk of radiation exposure. Also refused referral for LVRS in the past.  Presents today at baseline status. No exacerbations or hospitalizations since last seen. Continues on Trelegy, no increased SERENA use. Stopped Daliresp, cost went up to over $400 first of the year. Home 02 at 3-4 Liters with activity. Doing well and compliant with BiPAP. Stable cardiac status.       Modified Medical Research Lime Dyspnea Scale [facility])  Patient Symptom Severity: 3    0 = Only with strenuous exercise. 1 = When hurrying or walking up a slight hill. 2 = Walk slower than people of the same age because of SOB or has to stop for breath when walking at own pace. 3 = Stop for breath after walking 100 yards or after a few minutes. 4 = Too dyspneic to leave house or breathless when dressing. Progress History:   Since last visit any new medical issues? No.  New ER or hospitlal visits? No.  Any new or changes in medicines? No.  Using inhalers? Yes. Are they helpful? Yes.   Past Medical hx   PMH:  Past Medical History:   Diagnosis Date    Alkaline reflux esophagitis     Congenital hiatus hernia     COPD (chronic obstructive pulmonary disease) (HCC)     Disorder of lipoid metabolism     DM2 (diabetes mellitus, type 2) (HCC)     Hypertension     Mass of place, and time. No focal deficits. Skin: Skin is warm and dry. No clubbing, no cyanosis. Test results   Lung Nodule Screening   [x] Qualifies    [] Does not qualify   [] Declined    [] Completed    --The USPSTF recommends annual screening for lung cancer with low-dose computed tomography (LDCT) in adults aged 54 to 80 years who have a 30 pack-year smoking history and currently smoke or have quit within the past 15 years. Screening should be discontinued once a person has not smoked for 15 years or develops a health problem that substantially limits life expectancy or the ability or willingness to have curative lung surgery. Assessment      Diagnosis Orders   1. Stage 3 severe COPD by GOLD classification ([address]  Full PFT Study With Bronchodilator    DISCONTINUED: Roflumilast (DALIRESP) 500 MCG tablet   2. Simple chronic bronchitis ([address]     3. Chronic respiratory failure with hypoxia (HCC)     4. LEV treated with BiPAP     5. Lung nodules     6. Personal history of tobacco use  DC VISIT TO DISCUSS LUNG CA SCREEN W LDCT    CT Lung Screen (Annual)         Plan   Doing well, baseline status. Continue Trelegy and SERENA PRN. Will try for medication assistance for Daliresp. 1 sample given for now. He has done very well since start of [address] with no hospitalizations nor exacerbations. Continue 02 at 3-4 Liters with activity. Continue BiPAP with 1 Liter 02 bleed in, follow up as scheduled. We had in depth conversation about CT Lung screening: He now agrees to proceed. Schedule in 6 months when returns with full PFT. Continues to refuse pulmonary rehab. PCP follow up for vaccines. He plans to get Covid 19 vaccine. Will see Kacie Maldonado back in: 6 months.     30 minutes was spent on this visit with > 50% being face to face obtaining HPI, examining patient, reviewing test results, educating about disease process, discussing CT lung screening and coordinating a treatment plan.    Electronically signed by SURY Burnham CNP on 1/27/2021 at 1:11 PM      Low Dose CT (LDCT) Lung Screening criteria met   Age 50-69   Pack year smoking >30   Still smoking or less than 15 year since quit   No sign or symptoms of lung cancer   > 11 months since last LDCT     Risks and benefits of lung cancer screening with LDCT scans discussed:    Significance of positive screen - False-positive LDCT results often occur. 95% of all positive results do not lead to a diagnosis of cancer. Usually further imaging can resolve most false-positive results; however, some patients may require invasive procedures. Over diagnosis risk - 10% to 12% of screen-detected lung cancer cases are over diagnosed--that is, the cancer would not have been detected in the patient's lifetime without the screening. Need for follow up screens annually to continue lung cancer screening effectiveness     Risks associated with radiation from annual LDCT- Radiation exposure is about the same as for a mammogram, which is about 1/3 of the annual background radiation exposure from everyday life. Starting screening at age 54 is not likely to increase cancer risk from radiation exposure. Patients with comorbidities resulting in life expectancy of < 10 years, or that would preclude treatment of an abnormality identified on CT, should not be screened due to lack of benefit.     To obtain maximal benefit from this screening, smoking cessation and long-term abstinence from smoking is critical

## 2021-03-28 NOTE — PROGRESS NOTES
[place] [facility]                        Pulmonary medicine and sleep medicine clinic follow up note. Patient: Shivani Jones  : 1955     Lung Nodule Screening     [x]? Qualifies    []? Does not qualify   []? Declined    []? Completed      [place]: Sihvani Jones is a 63 y. o.oldmale came for follow up regarding his severe COPD and  home O2 reevaluation. Patient want to go on [employer] portable oxygen concentrator for his oxygen supplementation. He is currently following with the [facility] and receiving portable oxygen tanks. He last one of his oxygen tank last year. He is currently on no home O2 at rest. He needs 3LPM of home O2 with exercise and 1LPM of home oxygen at night time bleed in with his BiPAP. She needs oxygen re evaluation. She used to follow with Ms.Stacey Balaji CNP at [facility]. After Ms. Ro Carpio CNP's departure he came for follow up today. On today's questioning:  He denies cough or expectoration. He denies hemoptysis. He denies fever or chills. He denies recent hospitalizations or emergency room visits. He is using his prescribed inhalers with good compliance. He is using rescue inhaler/nebs rarely. He denies recent loss of weight or appetite changes. He denies recent decline in functional status. He is currently using:  Trelegy Ellipta 100mcg/ 62.5mcg/25mcg per puff, 1puff daily in am   Albuterol HFA 90mcg/Spray MDI, 2puffs  Q6Hprn. He stopped taking Daliresp, cost went up to over $400 first of the year. He was diagnosed with Severe obstructive sleep apnea on treatment with a BiPAP with pressures of 21/17cm H20 along with 1LPM of O2 bleed in. He is using his BiPAP with excellent complianace for > 4hours of therapy. He denies any problems with his machine. He is sleeping well at night time.     He was initially referred from Dr. Rogelio Espinoza.     He denies any history of Glucoma or urinary retention in the past.    He was referred to Dr. Fiona Richardson, CT surgeon at [facility] Cardiothoracic surgery department for further evaluation and management of his severe COPD with decreaing lung function for LVRS Vs lung transplant. He quit smoking 2years back. How ever he did not want to go any referral. He verbalizes understanding of consequences of his decision. Social History: Occupation: He used to work as a . He is currently not working      Patient admits tohistory of tobacco smoking. He used to smoke 2  PPD for 40 Years. He quit smoking in 06/26/15. He denies history of exposure to coal, foundry, [employer], asbestos or significant farm dust exposure. Patient is a . Patient denies history of exposure to birds, pigeons, or chickens in the past. The patient admits toto pet animals at home. Hecurrently had 3dogs, 1 [place] pig and 4 cats at home. He denies to history of recreational or IV drug use. Hedenies history of pulmonary embolism or DVT in the past.      Review of Systems:   General/Constitutional: he gained 4lbs of weight from the last visit with normal appetite. No fever or chills. HENT: Negative. Eyes: Negative. Upper respiratory tract: No nasal stuffiness or post nasal drip. Lower respiratory tract/ lungs: See HPI. No hemoptysis. Cardiovascular: No palpitations or chest pain. Gastrointestinal: No nausea or vomiting. Neurological: No focal neurologiacal weakness. Extremities: No edema. Musculoskeletal: No complaints. Genitourinary: No complaints. Hematological: Negative. Psychiatric/Behavioral: Negative. Skin: No itching.       Current Medications:      Past Medical History:   Diagnosis Date    Alkaline reflux esophagitis     Congenital hiatus hernia     COPD (chronic obstructive pulmonary disease) (HCC)     Disorder of lipoid metabolism     DM2 (diabetes mellitus, type 2) (HCC)     Hypertension     Mass of lung     Obstructive chronic bronchitis without exacerbation ([facility]     LEV treated with BiPAP     Peripheral vascular disease (HCC)     Reflux esophagitis     Tobacco abuse        Past Surgical History:   Procedure Laterality Date    CARDIAC CATHETERIZATION      OTHER SURGICAL HISTORY  8/2015    Atrial flutter ablation       No Known Allergies    Current Outpatient Medications   Medication Sig Dispense Refill    fluticasone-umeclidin-vilant (TRELEGY ELLIPTA) 100-62.5-25 MCG/INH AEPB Inhale 1 puff into the lungs daily 30 each 11    albuterol sulfate  (90 Base) MCG/ACT inhaler Inhale 2 puffs into the lungs every 6 hours as needed for Wheezing 1 Inhaler 11    pentoxifylline (TRENTAL) 400 MG extended release tablet Take 400 mg by mouth 2 times daily      insulin glargine (BASAGLAR KWIKPEN) 100 UNIT/ML injection pen Inject into the skin nightly      UNABLE TO FIND pts on insulin not sure of the name of it      atorvastatin (LIPITOR) 10 MG tablet Take 10 mg by mouth daily      losartan (COZAAR) 50 MG tablet Take 50 mg by mouth daily      metoprolol succinate (TOPROL XL) 100 MG extended release tablet Take 100 mg by mouth daily      metFORMIN (GLUCOPHAGE) 500 MG tablet Take 500 mg by mouth 2 times daily (with meals)      IRON PO Take by mouth      dabigatran (PRADAXA) 150 MG capsule Take 150 mg by mouth 2 times daily      diltiazem (CARDIZEM CD) 120 MG ER capsule Take 240 mg by mouth 2 times daily       OXYGEN Inhale 2-4 L into the lungs daily 2L during am, 4L qhs      buPROPion (WELLBUTRIN) 100 MG tablet Take 100 mg by mouth 2 times daily       No current facility-administered medications for this visit. No family history on file.       Physical Exam     VITALS:    /78 (Site: Right Upper Arm, Position: Sitting, Cuff Size: Large Adult)   Pulse 54   Temp 96.6 °F (35.9 °C) (Temporal)   Ht 6' 4\" (1.93 m)   Wt (!) 334 lb 3.2 oz (151.6 kg)   SpO2 98% Comment: on 4 liters  BMI 40.68 kg/m² Neck Circumference - 20     Mallampati - 4  Nursing note and vitals reviewed. Constitutional: Patient appears well built and well nourished. No distress. Patient is oriented to person, place, and time. HENT:   Head: Normocephalic and atraumatic. Right Ear: External ear normal.   Left Ear: External ear normal.   Mouth/Throat: Oropharynx is clear and moist.  No oral thrush. Eyes: Conjunctivae are normal. Pupils are equal, round, and reactive to light. No scleral icterus. Neck: Neck supple. No JVD present. No tracheal deviation present. Cardiovascular: Normal rate, regular rhythm, normal heart sounds. No murmur heard. Pulmonary/Chest: Effort normal and breath sounds normal. No stridor. No respiratory distress. Occasional wheezes. No rales. Patient exhibits no tenderness. Abdominal: Soft. Patient exhibits no distension. No tenderness. Musculoskeletal: Normal range of motion. Extremities: Patient exhibits no edema and no tenderness. Lymphadenopathy:  No cervical adenopathy. Neurological: Patient is alert and oriented to person, place, and time. Skin: Skin is warm and dry. Patient is not diaphoretic. Psychiatric: Patient  has a normal mood and affect. Patient behavior is normal.       Diagnostic Data:    Echocardiogram: 2016                    Nocturnal pulse ox study: 6/7/16                    CT chest:          Serum [id number] level: 12/29/15                PET scan:  Jul 11, 2016  PROCEDURE: PET CT SKULL BASE TO MID THIGH  1. No significant metabolic activity in a left upper lobe subpleural density, likely nodular scarring. 2. Chronic lung disease. Split night sleep test:2016                   Spirometry 2017: 12/19/16                     CT chest: 7/10/17             Six Minute Walk Test done on 3/22/18 at 10:21 AM    Shaheen Joiner 1955    Six minute walk test done in my office today by my medical assistant Miss. Aparicio: Jeremiah's oxygen saturation at rest on room air was 93%. His oxygen saturation dropped to 84% on room air with exertion after 2 minute 0 seconds. The six minute walk test was repeated with oxygen supplementation. Oxygen supplimentation was started with 2LPM via nasal cannula and titrated to 3 LPM via nasal cannula. At the end of the test Jeremiah Loredo's oxygen saturation remained at 93% on 3LPM with exertion. He is mobile in the home and requires oxygen as outlined above. Sharmin Penn needs no home O2 at rest. He needs 3LPM of home O2 with exercise. DME Medical Necessity Documentation    Patient was seen in my clinic on 3/22/18 for the diagnosis COPD. I am prescribing oxygen because the diagnosis and testing requires the patient to have oxygen in the home. Condition will improve or be benefited by oxygen use. The patient is  able to perform good mobility in a home setting and therefore does require the use of a portable oxygen system. Spirometry: 7/16/19              Six Minute Walk Test done on 3/29/21 at 3:17 PM EDT    Sharmin Penn 1955    Six minute walk test done in my office today by medical assistant Miss: Susan Garcias  Oxygen saturation at rest on room air was (Percent): 97  Oxygen saturation on room air with exertion dropped to (Percent): 93      Time from beginning of the test to above desaturation: 6 minute 0 seconds. The six minute walk test was repeated with oxygen supplementation. Oxygen supplimentation was started with 2LPM via nasal cannula from portable oxygen concentrator (ActiveOx) and titrated to 3 LPM via nasal cannula. At the end of the test his oxygen saturation remained at 93 % on 3 LPM with exertion. He is mobile in the home and requires oxygen as outlined above. He needs no home O2 at rest. He needs 3 LPM of home O2 with exercise on portable oxygen concentrator (ActiveOx)  He will be evaluated for nocturnal home O2 requirement- see separte order.     Please see scanned six minute walk test appointment with my clinic if he develops any constitutional symptoms including loss of weight, poor appetite or hemoptysis. He verbalizes under standing.  -Liseth Foreman was advised to keep his scheduled follow up at [facility] for pulmonary disease) on 7/28/2021 with Ms. Flavia Thomas CNP. Pulmonary clinic for further evaluation and management of his COPD with planned CT chest and down load from his BiPAP. Patient advised to make an early appointment if needed.  -Liseth Foreman was advised to make early appointment with my clinic if he develops any constitutional symptoms including loss of weight, poor appetite or hemoptysis. He verbalizes under standing.  - Liseth Foreman and his wife were educated about my impression and plan. They verbalizes understanding.       -I personally reviewed and updated the Past medical hx, Past surgical hx,Social hx, Family hx, Medications, Allergies in the discrete data section of the patient chart. I also reviewed pertaining labs and Pulmonary medicine,Sleep medicine related, Pathological, Microbiological and Radiological investigations.

## 2021-03-29 ENCOUNTER — OFFICE VISIT (OUTPATIENT)
Dept: PULMONOLOGY | Age: 66
End: 2021-03-29
Payer: COMMERCIAL

## 2021-03-29 VITALS
BODY MASS INDEX: 38.36 KG/M2 | OXYGEN SATURATION: 98 % | SYSTOLIC BLOOD PRESSURE: 132 MMHG | TEMPERATURE: 96.6 F | HEART RATE: 54 BPM | WEIGHT: 315 LBS | HEIGHT: 76 IN | DIASTOLIC BLOOD PRESSURE: 78 MMHG

## 2021-03-29 DIAGNOSIS — J44.9 STAGE 3 SEVERE COPD BY GOLD CLASSIFICATION (HCC): Primary | ICD-10-CM

## 2021-03-29 DIAGNOSIS — J96.11 CHRONIC RESPIRATORY FAILURE WITH HYPOXIA (HCC): ICD-10-CM

## 2021-03-29 DIAGNOSIS — G47.33 OSA TREATED WITH BIPAP: ICD-10-CM

## 2021-03-29 PROCEDURE — 99214 OFFICE O/P EST MOD 30 MIN: CPT | Performed by: INTERNAL MEDICINE

## 2021-03-29 NOTE — PROGRESS NOTES
Neck Circumference - 20     Mallampati - 4    Lung Nodule Screening     [x] Qualifies    [] Does not qualify   [] Declined    [] Completed

## 2021-03-29 NOTE — PATIENT INSTRUCTIONS
Recommendations/Plan:   -Continue patient onTrelegy Ellipta 100mcg/ 62.5mcg/25mcg per puff, 1puff daily in am.   -Continue Albuterol HFA 90mcg/Spray MDI, 2puffs Q6Hprn.  Ayaan Espinosa advised to continue prescribed inhalers and keep good compliance. He verbalizes understanding.   - Patient educated to update his pneumococcal vaccine with family physician and take influenza vaccine in coming season with out fail. Ayaan Espinosa verbalizes understanding.   -He needs no home O2 at rest. He needs 3 LPM of home O2 with exercise on portable oxygen concentrator (ActiveOx)/ [employer] and 1LPM of home oxygen at night time bleed in with his BiPAP. -At the request of patient, physician order was given to evaluate patient for oxygen conserving device I.e Inogen.  -Ayaan Espinosa was advised to make early appointment with my clinic if he develops any constitutional symptoms including loss of weight, poor appetite or hemoptysis. He verbalizes under standing.  -Ayaan Espinosa was advised to keep his scheduled follow up at [facility] for pulmonary disease) on 7/28/2021 with Ms. Flavia Thomas CNP. Pulmonary clinic for further evaluation and management of his COPD with planned CT chest and down load from his BiPAP. Patient advised to make an early appointment if needed.  -Ayaan Espinosa was advised to make early appointment with my clinic if he develops any constitutional symptoms including loss of weight, poor appetite or hemoptysis. He verbalizes under standing.  - Ayaan Espinosa and his wife were educated about my impression and plan. They verbalizes understanding.

## 2021-03-30 DIAGNOSIS — J96.11 CHRONIC RESPIRATORY FAILURE WITH HYPOXIA (HCC): ICD-10-CM

## 2021-03-30 DIAGNOSIS — J44.9 STAGE 3 SEVERE COPD BY GOLD CLASSIFICATION (HCC): ICD-10-CM

## 2021-03-30 DIAGNOSIS — G47.33 OSA TREATED WITH BIPAP: ICD-10-CM

## 2021-03-30 PROCEDURE — 94618 PULMONARY STRESS TESTING: CPT | Performed by: INTERNAL MEDICINE

## 2021-07-15 DIAGNOSIS — Z87.891 PERSONAL HISTORY OF TOBACCO USE: ICD-10-CM

## 2021-07-22 DIAGNOSIS — J44.9 STAGE 3 SEVERE COPD BY GOLD CLASSIFICATION (HCC): ICD-10-CM

## 2021-07-27 ENCOUNTER — HOSPITAL ENCOUNTER (OUTPATIENT)
Dept: CT IMAGING | Age: 66
Discharge: HOME | End: 2021-07-27

## 2021-07-27 DIAGNOSIS — Z00.6 ENCOUNTER FOR EXAMINATION FOR NORMAL COMPARISON AND CONTROL IN CLINICAL RESEARCH PROGRAM: ICD-10-CM

## 2021-07-28 ENCOUNTER — OFFICE VISIT (OUTPATIENT)
Dept: PULMONOLOGY | Age: 66
End: 2021-07-28
Payer: COMMERCIAL

## 2021-07-28 VITALS
HEIGHT: 76 IN | BODY MASS INDEX: 38.36 KG/M2 | WEIGHT: 315 LBS | HEART RATE: 103 BPM | TEMPERATURE: 97.7 F | DIASTOLIC BLOOD PRESSURE: 78 MMHG | SYSTOLIC BLOOD PRESSURE: 136 MMHG | OXYGEN SATURATION: 98 %

## 2021-07-28 DIAGNOSIS — G47.33 OSA TREATED WITH BIPAP: ICD-10-CM

## 2021-07-28 DIAGNOSIS — Z87.891 PERSONAL HISTORY OF TOBACCO USE: ICD-10-CM

## 2021-07-28 DIAGNOSIS — J96.11 CHRONIC RESPIRATORY FAILURE WITH HYPOXIA (HCC): Primary | ICD-10-CM

## 2021-07-28 DIAGNOSIS — J44.9 STAGE 3 SEVERE COPD BY GOLD CLASSIFICATION (HCC): ICD-10-CM

## 2021-07-28 PROCEDURE — 99215 OFFICE O/P EST HI 40 MIN: CPT | Performed by: NURSE PRACTITIONER

## 2021-07-28 RX ORDER — FLUTICASONE FUROATE, UMECLIDINIUM BROMIDE AND VILANTEROL TRIFENATATE 100; 62.5; 25 UG/1; UG/1; UG/1
1 POWDER RESPIRATORY (INHALATION) DAILY
Qty: 30 EACH | Refills: 11 | Status: SHIPPED | OUTPATIENT
Start: 2021-07-28 | End: 2022-07-28

## 2021-07-28 RX ORDER — ALBUTEROL SULFATE 90 UG/1
2 AEROSOL, METERED RESPIRATORY (INHALATION) EVERY 6 HOURS PRN
Qty: 1 INHALER | Refills: 11 | Status: SHIPPED | OUTPATIENT
Start: 2021-07-28

## 2021-07-28 RX ORDER — GABAPENTIN 100 MG/1
100 CAPSULE ORAL 3 TIMES DAILY
COMMUNITY

## 2021-07-28 ASSESSMENT — ENCOUNTER SYMPTOMS
NAUSEA: 0
DIARRHEA: 0
WHEEZING: 0
ABDOMINAL PAIN: 0
SHORTNESS OF BREATH: 1
VOMITING: 0
COUGH: 0
EYES NEGATIVE: 1

## 2021-07-28 NOTE — PROGRESS NOTES
[facility]      Umair Subramanian         [id number]  7/28/2021   Chief Complaint   Patient presents with    Follow-up     COPD 4 month pulmonary follow up  PFT /CT 7/15/21 @ [address]         Pt of Dr. Tiarra Mendez     PAP Download:   Original or initial AHI: 5    Date of initial study: 7/2/13    Split night PSG 9/19/2016- AHI 67.3, placed on BiPAP after titration     Compliant  %  12.2%      PAP Type BiPAP Level  21/17 with 1LPM O2 bleed in  Avg Hrs/Day - UNK  AHI: 0.1   Recorded compliance dates ,5/7/2021-7/27/201  Machine/Mfg:   [] ResMed    [] Respironics/Dreamstation   Interface:   [] Nasal    [] Nasal pillows   [x] FFM       Provider:      [] [facility]     [][employer]     [] [employer]    [x] Normal    [] [facility]               [] [employer]      [] [employer]    [] [address]      [] Other    Neck Size: 20  Mallampati Mallampati 4  ESS:  0  SAQLI: 86    Here is a scan of the most recent download:            Presentation:   Rossana Baldwin presents for sleep medicine follow up for obstructive sleep apnea/ COPD   Since the last visit, Rossana Baldwin reports he is using his BIPAP everynight and even PRN during the day with naps   Has always had good compliance on previous DL's , current one is hard to read, looks like only 12%, which pt states is \"not true\" wife attests to that. Wife reports their Internet at home has been spotty and not working well. He admits to using more oxygen in PAP than prescribed. Supposed to be on 1LPM often has it on 3-4LPM , feels better and sleeps better with higher O2 level. Severe COPD- completed PFT for review today   Denies cough or wheezing. Only SOB with strenuous activity and in heat. Admits to sedentary lifestyle.    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 No significant metabolic activity in a left upper lobe subpleural density, likely nodular scarring. 2. Chronic lung disease   A1A MM   6 min walk 3/29/2021 done in pulmonary office: needed 0LPM at rest, 3LPM w/ activity. Is also prescribed 1LPM bleed into PAP. O2 order was updated per Dr Angelina Pruitt MD after walk  Is using  POC today     Equipment (BIPAP)  issues: The pressure is  acceptable, the mask is acceptable   Review of Systems -   Review of Systems   Constitutional: Negative for activity change, appetite change, chills, fatigue, fever and unexpected weight change. HENT: Negative. Eyes: Negative. Respiratory: Positive for shortness of breath. Negative for cough and wheezing. Cardiovascular: Negative for chest pain, palpitations and leg swelling. Gastrointestinal: Negative for abdominal pain, diarrhea, nausea and vomiting. Genitourinary: Negative. Musculoskeletal: Negative. Skin: Negative. Neurological: Negative. Hematological: Does not bruise/bleed easily. Psychiatric/Behavioral: Negative for sleep disturbance and suicidal ideas. Physical Exam:    BMI:  Body mass index is 41.17 kg/m². Wt Readings from Last 3 Encounters:   07/28/21 (!) 338 lb 3.2 oz (153.4 kg)   03/29/21 (!) 334 lb 3.2 oz (151.6 kg)   01/27/21 (!) 331 lb (150.1 kg)     Weight stable / unchanged  Vitals: /78 (Site: Left Upper Arm, Position: Sitting, Cuff Size: Medium Adult)   Pulse 103   Temp 97.7 °F (36.5 °C)   Ht 6' 4\" (1.93 m)   Wt (!) 338 lb 3.2 oz (153.4 kg)   SpO2 98% Comment: on 3 liters O2  BMI 41.17 kg/m²       Physical Exam  Vitals and nursing note reviewed. Constitutional:       General: He is not in acute distress. Appearance: He is well-developed. He is obese. Interventions: Nasal cannula in place.       Comments: Pleasant ,likes to joke    HENT: Mouth/Throat:      Lips: Pink. Mouth: Mucous membranes are moist.      Pharynx: Oropharynx is clear. No oropharyngeal exudate or posterior oropharyngeal erythema. Eyes:      Conjunctiva/sclera: Conjunctivae normal.   Neck:      Vascular: No JVD. Cardiovascular:      Rate and Rhythm: Normal rate and regular rhythm. Heart sounds: No murmur heard. No friction rub. Pulmonary:      Effort: Pulmonary effort is normal. No accessory muscle usage or respiratory distress. Breath sounds: Normal breath sounds. No wheezing, rhonchi or rales. Chest:      Chest wall: No tenderness. Musculoskeletal:      Right lower leg: No edema. Left lower leg: No edema. Skin:     General: Skin is warm and dry. Capillary Refill: Capillary refill takes less than 2 seconds. Nails: There is no clubbing. Neurological:      Mental Status: He is alert. Psychiatric:         Mood and Affect: Mood normal.         Behavior: Behavior normal.         Thought Content: Thought content normal.         Judgment: Judgment normal.           TESTING                    Stable spirometry compared to 2019- SVC/ERV improved     ASSESSMENT/DIAGNOSIS     Diagnosis Orders   1. Chronic respiratory failure with hypoxia ([facility])     2. Stage 3 severe COPD by GOLD classification ([facility])  albuterol sulfate  (90 Base) MCG/ACT inhaler    fluticasone-umeclidin-vilant (TRELEGY ELLIPTA) 100-62.5-25 MCG/INH AEPB   3. LEV treated with BiPAP     4. Personal history of tobacco use              Plan      LEV  Do you need any equipment today? Yes - updated supply order  - Download reviewed and discussed with patient  - He  was advised to continue current positive airway pressure therapy with above described pressure.    - poor compliance reading likely due to poor Internet at home, he should call his Internet provider, may have to consider taking Chip to [facility] next year for DL before appt   -recall information discussed, recommend continued use of his current CPAP until replacement available due to the severity of his underlying LEV     - He  advised to keep good compliance with current recommended pressure to get optimal results and clinical improvement  - Recommend 7-9 hours of sleep with PAP  - He was advised to call [employer] regarding supplies if needed. - He was instructed on weight loss    COPD/Emphysema  -stable CT , recommend annual screenings - he declines to schedule one for next year  -stable PFT compared to 2019  -continue Trelegy ellipta- educated on importance of compliance and how inhalers work - Long acting vs short acting   -continue oxygen as prescribed ( 3LPM w/ activity, 1LPM with bleed in )     - Jose Rojas was educated about my impression and plan. Patient verbalizesunderstanding.   We will see Milo Vera back in: 1 year for pulmonary   F/u in 6 months for LEV w/ DL    Total time on encounter was 55 min   Information added by my medical assistant/LPN was reviewed today  Electronically signed by SURY Wyman CNP on 7/28/2021 at 2:52 PM

## 2022-08-10 ENCOUNTER — OFFICE VISIT (OUTPATIENT)
Dept: PULMONOLOGY | Age: 67
End: 2022-08-10
Payer: COMMERCIAL

## 2022-08-10 VITALS
DIASTOLIC BLOOD PRESSURE: 76 MMHG | SYSTOLIC BLOOD PRESSURE: 132 MMHG | HEART RATE: 78 BPM | HEIGHT: 76 IN | TEMPERATURE: 97.9 F | OXYGEN SATURATION: 98 % | BODY MASS INDEX: 38.36 KG/M2 | WEIGHT: 315 LBS

## 2022-08-10 DIAGNOSIS — G47.33 OSA TREATED WITH BIPAP: ICD-10-CM

## 2022-08-10 DIAGNOSIS — J44.9 STAGE 3 SEVERE COPD BY GOLD CLASSIFICATION (HCC): Primary | ICD-10-CM

## 2022-08-10 DIAGNOSIS — Z87.891 PERSONAL HISTORY OF TOBACCO USE: ICD-10-CM

## 2022-08-10 DIAGNOSIS — J96.11 CHRONIC RESPIRATORY FAILURE WITH HYPOXIA (HCC): ICD-10-CM

## 2022-08-10 PROCEDURE — 3023F SPIROM DOC REV: CPT | Performed by: NURSE PRACTITIONER

## 2022-08-10 PROCEDURE — 3017F COLORECTAL CA SCREEN DOC REV: CPT | Performed by: NURSE PRACTITIONER

## 2022-08-10 PROCEDURE — G8427 DOCREV CUR MEDS BY ELIG CLIN: HCPCS | Performed by: NURSE PRACTITIONER

## 2022-08-10 PROCEDURE — 1036F TOBACCO NON-USER: CPT | Performed by: NURSE PRACTITIONER

## 2022-08-10 PROCEDURE — 1123F ACP DISCUSS/DSCN MKR DOCD: CPT | Performed by: NURSE PRACTITIONER

## 2022-08-10 PROCEDURE — 99214 OFFICE O/P EST MOD 30 MIN: CPT | Performed by: NURSE PRACTITIONER

## 2022-08-10 PROCEDURE — G8417 CALC BMI ABV UP PARAM F/U: HCPCS | Performed by: NURSE PRACTITIONER

## 2022-08-10 ASSESSMENT — ENCOUNTER SYMPTOMS
EYES NEGATIVE: 1
WHEEZING: 1
ABDOMINAL PAIN: 0
VOMITING: 0
DIARRHEA: 0
SHORTNESS OF BREATH: 1
COUGH: 1
NAUSEA: 0

## 2022-08-10 NOTE — PROGRESS NOTES
[place] [facility]     Patient: Kandace Reilly, 74 y.o.   : 1955  8/10/2022    Pt of Dr. Ivette Castillo   Patient presents with    Follow-up     1 year copd no testing        HPI  Coty Vela is here for 1 year follow up for COPD    Patient is experiencing SOB: yes, with exertion  Patient is experiencing wheezing: Yes, little  Patient states they have had a Strong, productive  cough. Phlegm is color:  clear  Patient is coughing up blood: no  Patient has been experiencing chest pains: non-existent  Patient is currently taking the following inhaler(s): Albuterol, Trelegy 100 mcg admits he does not use his inhaler daily   Wife states he has multiple un opened boxes laying around   Patient is using their rescue inhaler 2 times per day. Patient is currently using 3 liters of oxygen during sleep. Patient is currently using 3 liters of oxygen during exertion. No o2 at rest     Other: No other concerns, feels symptoms are at baseline  Declined ct lung screen     Any recent exacerbations that have required oral atb or steroids No  Any new medical issues since last visit : Yes pneumonia 2022, required BiPAP     Last spirometry: 2021. FEV 1 42% , FEF 25-75% 34, , DLCO 61    UTD with flu vaccine Yes  UTD with pneumonia vaccine Yes  UTD COVID vaccine yes      SOCIAL HISTORY:  Social History     Tobacco Use    Smoking status: Former     Packs/day: 2.00     Years: 40.00     Pack years: 80.00     Types: Cigarettes     Quit date: 2015     Years since quittin.1    Smokeless tobacco: Never   Substance Use Topics    Alcohol use: No     Alcohol/week: 0.0 standard drinks    Drug use: No         CURRENT MEDICATIONS:  Current Outpatient Medications   Medication Sig Dispense Refill    Fluticasone-Umeclidin-Vilant (TRELEGY ELLIPTA IN) Inhale into the lungs      gabapentin (NEURONTIN) 100 MG capsule Take 100 mg by mouth 3 times daily.       albuterol sulfate  (90 Base) MCG/ACT inhaler Inhale 2 puffs into the lungs every 6 hours as needed for Wheezing 1 Inhaler 11    pentoxifylline (TRENTAL) 400 MG extended release tablet Take 400 mg by mouth 2 times daily      insulin glargine (LANTUS;BASAGLAR) 100 UNIT/ML injection pen Inject into the skin nightly      UNABLE TO FIND pts on insulin not sure of the name of it      atorvastatin (LIPITOR) 10 MG tablet Take 10 mg by mouth daily      losartan (COZAAR) 50 MG tablet Take 50 mg by mouth daily      metoprolol succinate (TOPROL XL) 100 MG extended release tablet Take 100 mg by mouth daily      metFORMIN (GLUCOPHAGE) 500 MG tablet Take 500 mg by mouth 2 times daily (with meals)      IRON PO Take by mouth      dabigatran (PRADAXA) 150 MG capsule Take 150 mg by mouth 2 times daily      diltiazem (CARDIZEM CD) 120 MG ER capsule Take 240 mg by mouth 2 times daily       OXYGEN Inhale 2-4 L into the lungs daily 2L during am, 4L qhs      buPROPion (WELLBUTRIN) 100 MG tablet Take 100 mg by mouth in the morning and 100 mg before bedtime. No current facility-administered medications for this visit. Delbert STRATTON   Review of Systems   Constitutional:  Negative for activity change, appetite change, chills, fatigue, fever and unexpected weight change. HENT: Negative. Eyes: Negative. Respiratory:  Positive for cough, shortness of breath and wheezing. Cardiovascular:  Negative for chest pain, palpitations and leg swelling. Gastrointestinal:  Negative for abdominal pain, diarrhea, nausea and vomiting. Genitourinary: Negative. Musculoskeletal: Negative. Skin: Negative. Neurological: Negative. Hematological: Negative. Psychiatric/Behavioral: Negative.         Physical exam   /76 (Site: Right Lower Arm, Position: Sitting, Cuff Size: Medium Adult)   Pulse 78   Temp 97.9 °F (36.6 °C) (Skin)   Ht 6' 4\" (1.93 m)   Wt (!) 341 lb (154.7 kg)   SpO2 98%   BMI 41.51 kg/m²       Wt Readings from Last 3 Encounters:   08/10/22 (!) 341 lb (154.7 kg)   07/28/21 (!) 338 lb 3.2 oz (153.4 kg)   03/29/21 (!) 334 lb 3.2 oz (151.6 kg)     Physical Exam  Vitals and nursing note reviewed. Constitutional:       General: He is not in acute distress. Appearance: He is well-developed. He is obese. Interventions: Nasal cannula in place. HENT:      Mouth/Throat:      Lips: Pink. Mouth: Mucous membranes are moist.      Pharynx: Oropharynx is clear. No oropharyngeal exudate or posterior oropharyngeal erythema. Eyes:      Conjunctiva/sclera: Conjunctivae normal.   Neck:      Vascular: No JVD. Cardiovascular:      Rate and Rhythm: Normal rate and regular rhythm. Heart sounds: No murmur heard. No friction rub. Pulmonary:      Effort: Pulmonary effort is normal. No accessory muscle usage or respiratory distress. Breath sounds: Normal breath sounds. No wheezing, rhonchi or rales. Chest:      Chest wall: No tenderness. Musculoskeletal:      Right lower leg: No edema. Left lower leg: No edema. Skin:     General: Skin is warm and dry. Capillary Refill: Capillary refill takes less than 2 seconds. Nails: There is no clubbing. Neurological:      Mental Status: He is alert and oriented to person, place, and time. Psychiatric:         Mood and Affect: Mood normal.         Behavior: Behavior normal.         Thought Content: Thought content normal.         Judgment: Judgment normal.        Test results   Lung Nodule Screening     [] Qualifies    [x]Does not qualify   [] Declined    [] Completed     Assessment       ICD-10-CM    1. Stage 3 severe COPD [facility] ([facility]  J44.9       2. Chronic respiratory failure with hypoxia ([facility])  J96.11       3. LEV treated with BiPAP  G47.33       4.  Personal history of tobacco use  Z87.891             Plan    -educated on importance of inhalers and how they help control symptoms, help minimize exacerbations, and help improve lung function.  , denies any trouble with using trelegy or side effects   Plan to continue , use one puff once every day   Continue PRN albuterol   -avoid ill contacts  -keep UTD on recommended vaccinations- get flu vaccine this fall   -declines annual LDCT lung screen   -continue 3LPM O2 NC with sleep and activity   -recommend weight reduction     Will see Radha Plaza in: 1 year  billing based on time: total time on encounter 30 min   Electronically signed by SURY Mak CNP on 8/10/2022 at 1:09 PM

## 2022-08-10 NOTE — PROGRESS NOTES
Patient is experiencing SOB: yes, with exertion    Patient is experiencing wheezing: Yes, little    Patient states they have had a Strong, productive = 1 cough. Phlegm is color:  clear    Patient is coughing up blood: no    Patient has been experiencing chest pains: non-existent    Patient is currently taking the following inhaler(s): Albuterol, Trelegy    Patient is using their rescue inhaler 2 times per day. Patient is currently using 3 liters of oxygen during sleep. Patient is currently using 3 liters of oxygen during exertion.   No o2 at rest    Other: No other concerns

## 2023-02-23 ENCOUNTER — TELEPHONE (OUTPATIENT)
Dept: PULMONOLOGY | Age: 68
End: 2023-02-23

## 2023-02-23 NOTE — TELEPHONE ENCOUNTER
Tayla from [place] called stating patient switched to medicare September 2022 and will need new 6MWT and face to face for his oxygen. Last office visit on 8/10/22 will not work to cover patient's switch to medicare to keep oxygen.   Tayla stated she would reach out to patient to inform of situation and have patient call our office to schedule pulmonary office visit with test.

## 2023-03-15 ENCOUNTER — OFFICE VISIT (OUTPATIENT)
Dept: PULMONOLOGY | Age: 68
End: 2023-03-15
Payer: MEDICARE

## 2023-03-15 VITALS
WEIGHT: 315 LBS | HEIGHT: 76 IN | DIASTOLIC BLOOD PRESSURE: 74 MMHG | SYSTOLIC BLOOD PRESSURE: 122 MMHG | HEART RATE: 60 BPM | TEMPERATURE: 97.6 F | OXYGEN SATURATION: 97 % | BODY MASS INDEX: 38.36 KG/M2

## 2023-03-15 DIAGNOSIS — E88.81 METABOLIC SYNDROME: ICD-10-CM

## 2023-03-15 DIAGNOSIS — Z87.891 PERSONAL HISTORY OF TOBACCO USE: ICD-10-CM

## 2023-03-15 DIAGNOSIS — G47.33 OSA TREATED WITH BIPAP: ICD-10-CM

## 2023-03-15 DIAGNOSIS — J96.11 CHRONIC RESPIRATORY FAILURE WITH HYPOXIA (HCC): ICD-10-CM

## 2023-03-15 DIAGNOSIS — I50.32 CHRONIC DIASTOLIC CONGESTIVE HEART FAILURE (HCC): ICD-10-CM

## 2023-03-15 DIAGNOSIS — J44.9 STAGE 3 SEVERE COPD BY GOLD CLASSIFICATION (HCC): Primary | ICD-10-CM

## 2023-03-15 PROBLEM — K44.9 DIAPHRAGMATIC HERNIA WITHOUT OBSTRUCTION OR GANGRENE: Status: ACTIVE | Noted: 2021-06-17

## 2023-03-15 PROBLEM — D68.9 COAGULATION DISORDER (HCC): Status: ACTIVE | Noted: 2023-03-15

## 2023-03-15 PROBLEM — D50.9 IRON DEFICIENCY ANEMIA: Status: ACTIVE | Noted: 2023-03-15

## 2023-03-15 PROBLEM — D50.9 IRON DEFICIENCY ANEMIA, UNSPECIFIED: Status: ACTIVE | Noted: 2021-06-17

## 2023-03-15 PROBLEM — M71.9 BURSITIS: Status: ACTIVE | Noted: 2023-03-15

## 2023-03-15 PROBLEM — I48.0 PAROXYSMAL ATRIAL FIBRILLATION (HCC): Status: ACTIVE | Noted: 2021-06-17

## 2023-03-15 PROBLEM — G62.9 POLYNEUROPATHY, UNSPECIFIED: Status: ACTIVE | Noted: 2021-06-17

## 2023-03-15 PROBLEM — E11.51 TYPE 2 DIABETES MELLITUS WITH DIABETIC PERIPHERAL ANGIOPATHY WITHOUT GANGRENE (HCC): Status: ACTIVE | Noted: 2022-07-14

## 2023-03-15 PROBLEM — R06.89 HYPERCAPNIA: Status: ACTIVE | Noted: 2023-03-15

## 2023-03-15 PROCEDURE — 1123F ACP DISCUSS/DSCN MKR DOCD: CPT | Performed by: NURSE PRACTITIONER

## 2023-03-15 PROCEDURE — G8417 CALC BMI ABV UP PARAM F/U: HCPCS | Performed by: NURSE PRACTITIONER

## 2023-03-15 PROCEDURE — 94618 PULMONARY STRESS TESTING: CPT | Performed by: NURSE PRACTITIONER

## 2023-03-15 PROCEDURE — 99214 OFFICE O/P EST MOD 30 MIN: CPT | Performed by: NURSE PRACTITIONER

## 2023-03-15 PROCEDURE — G8484 FLU IMMUNIZE NO ADMIN: HCPCS | Performed by: NURSE PRACTITIONER

## 2023-03-15 PROCEDURE — G8427 DOCREV CUR MEDS BY ELIG CLIN: HCPCS | Performed by: NURSE PRACTITIONER

## 2023-03-15 PROCEDURE — 3017F COLORECTAL CA SCREEN DOC REV: CPT | Performed by: NURSE PRACTITIONER

## 2023-03-15 PROCEDURE — 3023F SPIROM DOC REV: CPT | Performed by: NURSE PRACTITIONER

## 2023-03-15 PROCEDURE — 1036F TOBACCO NON-USER: CPT | Performed by: NURSE PRACTITIONER

## 2023-03-15 RX ORDER — CITALOPRAM 40 MG/1
40 TABLET ORAL DAILY
COMMUNITY

## 2023-03-15 RX ORDER — OMEPRAZOLE 20 MG/1
20 CAPSULE, DELAYED RELEASE ORAL DAILY
COMMUNITY

## 2023-03-15 RX ORDER — ALBUTEROL SULFATE 90 UG/1
2 AEROSOL, METERED RESPIRATORY (INHALATION) EVERY 6 HOURS PRN
Qty: 1 EACH | Refills: 11 | Status: SHIPPED | OUTPATIENT
Start: 2023-03-15

## 2023-03-15 RX ORDER — FLUTICASONE FUROATE, UMECLIDINIUM BROMIDE AND VILANTEROL TRIFENATATE 100; 62.5; 25 UG/1; UG/1; UG/1
1 POWDER RESPIRATORY (INHALATION) DAILY
Qty: 30 EACH | Refills: 11 | Status: SHIPPED | OUTPATIENT
Start: 2023-03-15

## 2023-03-15 ASSESSMENT — ENCOUNTER SYMPTOMS
COUGH: 0
DIARRHEA: 0
NAUSEA: 0
WHEEZING: 0
SHORTNESS OF BREATH: 1
ABDOMINAL PAIN: 0
VOMITING: 0
EYES NEGATIVE: 1

## 2023-03-15 NOTE — PROGRESS NOTES
[place] [facility]     Patient: Vanda Cabrales, 74 y.o.   : 1955  3/15/2023    Pt of Dr. Maria Del Carmen Juan   Patient presents with    Follow-up     Medicare F2F and needs new 6MWT for [address] O[address] Renewal        HPI  Adán Mixon is here for 6 months follow up for COPD and Chronic Respiratory Failure , needs F2F for Medicare for O2 renewal   Wife at his side   Currently using 3LPM NC O2 with ambulation, 3LPM bleed into BiPAP with sleep , 0LPM with rest   Using POC when away from home, concentrator at home   Any new medical issues since last visit : no    Current Symptoms : stable SOB ,   Current treatment includes . Trelegy ellipta 100/62.5/25 mcg once daily - history of non compliance with meds, states he is using every day now     He requires his rescue inhaler and /or nebulizer 2 time(s) per day. Last spirometry: 2021.  FEV 1 42% , FEF 25-75% 34, , DLCO 61    Any recent exacerbations that have required oral atb or steroids No    Vaccines:  Influenza Yes, Pneumonia yes,, COVID Yes is due for recent booster         SOCIAL HISTORY:  Social History     Tobacco Use    Smoking status: Former     Packs/day: 2.00     Years: 40.00     Pack years: 80.00     Types: Cigarettes     Quit date: 2015     Years since quittin.7    Smokeless tobacco: Never   Substance Use Topics    Alcohol use: No     Alcohol/week: 0.0 standard drinks    Drug use: No       CURRENT MEDICATIONS:  Current Outpatient Medications   Medication Sig Dispense Refill    citalopram (CELEXA) 40 MG tablet Take 40 mg by mouth daily      apixaban (ELIQUIS) 5 MG TABS tablet Take by mouth 2 times daily      omeprazole (PRILOSEC) 20 MG delayed release capsule Take 20 mg by mouth daily      fluticasone-umeclidin-vilant (TRELEGY ELLIPTA) 100-62.5-25 MCG/ACT AEPB inhaler Inhale 1 puff into the lungs daily 30 each 11    albuterol sulfate HFA (PROVENTIL;VENTOLIN;PROAIR) 108 (90 Base) MCG/ACT inhaler Inhale 2 puffs into the lungs every 6 hours as needed for Wheezing 1 each 11    gabapentin (NEURONTIN) 100 MG capsule Take 100 mg by mouth 3 times daily.      pentoxifylline (TRENTAL) 400 MG extended release tablet Take 400 mg by mouth 2 times daily      insulin glargine (LANTUS;BASAGLAR) 100 UNIT/ML injection pen Inject into the skin nightly      atorvastatin (LIPITOR) 10 MG tablet Take 10 mg by mouth daily      losartan (COZAAR) 50 MG tablet Take 50 mg by mouth daily      metoprolol succinate (TOPROL XL) 100 MG extended release tablet Take 100 mg by mouth daily      metFORMIN (GLUCOPHAGE) 500 MG tablet Take 500 mg by mouth 2 times daily (with meals)      IRON PO Take by mouth      diltiazem (CARDIZEM CD) 120 MG ER capsule Take 240 mg by mouth 2 times daily       OXYGEN Inhale 2-4 L into the lungs daily 2L during am, 4L qhs       No current facility-administered medications for this visit.     .    ROS   Review of Systems   Constitutional:  Negative for activity change, appetite change, chills, fatigue, fever and unexpected weight change.   HENT: Negative.     Eyes: Negative.    Respiratory:  Positive for shortness of breath. Negative for cough and wheezing.    Cardiovascular:  Negative for chest pain, palpitations and leg swelling.   Gastrointestinal:  Negative for abdominal pain, diarrhea, nausea and vomiting.   Genitourinary: Negative.    Musculoskeletal:  Positive for arthralgias and gait problem.   Skin: Negative.    Hematological: Negative.    Psychiatric/Behavioral: Negative.          Physical exam   /74 (Site: Right Lower Arm, Position: Sitting, Cuff Size: Medium Adult)   Pulse 60   Temp 97.6 °F (36.4 °C) (Temporal)   Ht 6' 4\" (1.93 m)   Wt (!) 341 lb (154.7 kg)   SpO2 97%   BMI 41.51 kg/m²       Wt Readings from Last 3 Encounters:   03/15/23 (!) 341 lb (154.7 kg)   08/10/22 (!) 341 lb (154.7 kg)   07/28/21 (!) 338 lb 3.2 oz (153.4 kg)       Physical Exam  Vitals and nursing note  reviewed. Constitutional:       General: He is not in acute distress. Appearance: He is well-developed. He is morbidly obese. Interventions: Nasal cannula in place. Comments: Sitting in wheelchair    HENT:      Mouth/Throat:      Lips: Pink. Mouth: Mucous membranes are moist.      Pharynx: Oropharynx is clear. No oropharyngeal exudate or posterior oropharyngeal erythema. Eyes:      Conjunctiva/sclera: Conjunctivae normal.   Neck:      Vascular: No JVD. Cardiovascular:      Rate and Rhythm: Normal rate and regular rhythm. Heart sounds: No murmur heard. No friction rub. Pulmonary:      Effort: Pulmonary effort is normal. No accessory muscle usage or respiratory distress. Breath sounds: Normal breath sounds. No wheezing, rhonchi or rales. Chest:      Chest wall: No tenderness. Musculoskeletal:      Right lower leg: No edema. Left lower leg: No edema. Skin:     General: Skin is warm and dry. Capillary Refill: Capillary refill takes less than 2 seconds. Nails: There is no clubbing. Neurological:      Mental Status: He is alert and oriented to person, place, and time. Psychiatric:         Mood and Affect: Mood normal.         Behavior: Behavior normal.         Thought Content: Thought content normal.         Judgment: Judgment normal.         Test results   Lung Nodule Screening     [x] Qualifies    []Does not qualify   [] Declined    [x] Completed    Results from 2016 - required 1LPM O2 bleed in           Six Minute Walk Test- was on done with patient's own home O2 Inogen POC     Jesika Sauer 1955    Six minute walk test done in my office today by my medical assistant. Patient's wife also assisted by holding on to Bautista Edgar . Jeremiah's oxygen saturation at rest on room air was 92%. His oxygen saturation dropped to 88% on room air with exertion after walking 108 feet and within 2 minutes.     The six minute walk test was repeated with oxygen supplementation. Oxygen supplementation was started with 1 LPM via nasal cannula At the end of the test Jeremiah Gongora oxygen saturation remained at 91% on 1 LPM with exertion. He is mobile in the home and requires oxygen as outlined above. Patient ambulated a total of 108 feet with oxygen and only walked for 4 min, he requested the walk be terminated due to SOB and leg pain . Resting Dyspnea/Jodee score was 0 / 10  and 10 / 10  upon completion of the walk. Resting heart rate was  62 bpm and 97 bpm upon completing the walk. Nasal Oxygen order:  1 lpm to be used with:  Rest: No.  Walking: Yes. Sleep: yes. POC flow: Yes. Continuous flow: Yes at home with activity and with sleep     DME Medical Necessity Documentation    Luís Matthews was seen in the office on 3/15/2023 for the diagnosis COPD. I am prescribing oxygen because the diagnosis and testing requires the patient to have oxygen in the home. his condition will improve or be benefited by oxygen use. The patient is able to perform good mobility in a home setting and therefore does require the use of a portable oxygen system. Assessment       ICD-10-CM    1. Stage 3 severe COPD by GOLD classification ([facility])  J44.9 6 Minute Walk Test     Pulse oximetry, overnight     albuterol sulfate HFA (PROVENTIL;VENTOLIN;PROAIR) 108 (90 Base) MCG/ACT inhaler      2. Chronic respiratory failure with hypoxia ([facility])  J96.11 6 Minute Walk Test     Pulse oximetry, overnight      3. LEV treated with BiPAP  G47.33 Pulse oximetry, overnight      4. Personal history of tobacco use  Z87.891       5. Chronic diastolic congestive heart failure ([facility])  I50.32       6. Metabolic syndrome  U61.37         Plan    -6 min walk completed, needs 1LPM POC with walking. Pt educated on finding and continued need for oxygen .  He was very dyspneic with walking, could only ambulate 108 feet, he is deconditioned and educated on need to loose weight     -Order for overnight pulse ox on RA while wearing Bipap to see if bleed in O2 needed.     -continue Trelegy ellipta 100 mcg daily , rinse mouth after use / PRN albuterol HFA - refills escribed     -avoid ill contacts  -keep UTD on recommended vaccinations, discuss with PCP or pharmacist   (Please note that portions of this note may have been with a voice recognition program.  Efforts were made to edit the dictation but occasionally words are mis-transcribed )   Will need to place O2 orders once overnight pulse ox results are received     Will see Vinod Brandon in: 1 year  billing based on medical decision making   Electronically signed by SURY Garza CNP on 3/15/2023 at 12:56 PM

## 2024-03-12 DIAGNOSIS — G47.33 OSA TREATED WITH BIPAP: ICD-10-CM

## 2024-03-12 DIAGNOSIS — J96.11 CHRONIC RESPIRATORY FAILURE WITH HYPOXIA (HCC): ICD-10-CM

## 2024-03-12 DIAGNOSIS — J44.9 STAGE 3 SEVERE COPD BY GOLD CLASSIFICATION (HCC): ICD-10-CM

## 2024-03-13 ENCOUNTER — HOSPITAL ENCOUNTER (OUTPATIENT)
Dept: GENERAL RADIOLOGY | Age: 69
Discharge: HOME | End: 2024-03-13

## 2024-03-13 ENCOUNTER — OFFICE VISIT (OUTPATIENT)
Dept: PULMONOLOGY | Age: 69
End: 2024-03-13
Payer: MEDICARE

## 2024-03-13 VITALS
BODY MASS INDEX: 38.36 KG/M2 | WEIGHT: 315 LBS | DIASTOLIC BLOOD PRESSURE: 82 MMHG | OXYGEN SATURATION: 94 % | HEIGHT: 76 IN | HEART RATE: 73 BPM | SYSTOLIC BLOOD PRESSURE: 130 MMHG | TEMPERATURE: 97.5 F

## 2024-03-13 DIAGNOSIS — J96.11 CHRONIC RESPIRATORY FAILURE WITH HYPOXIA (HCC): ICD-10-CM

## 2024-03-13 DIAGNOSIS — Z00.6 ENCOUNTER FOR EXAMINATION FOR NORMAL COMPARISON OR CONTROL IN CLINICAL RESEARCH PROGRAM: ICD-10-CM

## 2024-03-13 DIAGNOSIS — J44.9 STAGE 3 SEVERE COPD BY GOLD CLASSIFICATION (HCC): Primary | ICD-10-CM

## 2024-03-13 DIAGNOSIS — R91.8 PULMONARY NODULES: ICD-10-CM

## 2024-03-13 DIAGNOSIS — Z87.891 PERSONAL HISTORY OF TOBACCO USE: ICD-10-CM

## 2024-03-13 DIAGNOSIS — G47.33 OSA TREATED WITH BIPAP: ICD-10-CM

## 2024-03-13 DIAGNOSIS — F06.4 ANXIETY DISORDER DUE TO MEDICAL CONDITION: ICD-10-CM

## 2024-03-13 DIAGNOSIS — E66.01 MORBID OBESITY WITH BMI OF 40.0-44.9, ADULT (HCC): ICD-10-CM

## 2024-03-13 PROCEDURE — G8427 DOCREV CUR MEDS BY ELIG CLIN: HCPCS | Performed by: NURSE PRACTITIONER

## 2024-03-13 PROCEDURE — G8417 CALC BMI ABV UP PARAM F/U: HCPCS | Performed by: NURSE PRACTITIONER

## 2024-03-13 PROCEDURE — G0296 VISIT TO DETERM LDCT ELIG: HCPCS | Performed by: NURSE PRACTITIONER

## 2024-03-13 PROCEDURE — 1123F ACP DISCUSS/DSCN MKR DOCD: CPT | Performed by: NURSE PRACTITIONER

## 2024-03-13 PROCEDURE — 3017F COLORECTAL CA SCREEN DOC REV: CPT | Performed by: NURSE PRACTITIONER

## 2024-03-13 PROCEDURE — 3023F SPIROM DOC REV: CPT | Performed by: NURSE PRACTITIONER

## 2024-03-13 PROCEDURE — 1036F TOBACCO NON-USER: CPT | Performed by: NURSE PRACTITIONER

## 2024-03-13 PROCEDURE — 99215 OFFICE O/P EST HI 40 MIN: CPT | Performed by: NURSE PRACTITIONER

## 2024-03-13 PROCEDURE — G8484 FLU IMMUNIZE NO ADMIN: HCPCS | Performed by: NURSE PRACTITIONER

## 2024-03-13 RX ORDER — FLUTICASONE PROPIONATE AND SALMETEROL XINAFOATE 230; 21 UG/1; UG/1
2 AEROSOL, METERED RESPIRATORY (INHALATION) 2 TIMES DAILY
Qty: 1 EACH | Refills: 11 | Status: SHIPPED | OUTPATIENT
Start: 2024-03-13 | End: 2024-03-14

## 2024-03-13 RX ORDER — FERROUS SULFATE 325(65) MG
325 TABLET ORAL
COMMUNITY

## 2024-03-13 RX ORDER — ALBUTEROL SULFATE 90 UG/1
2 AEROSOL, METERED RESPIRATORY (INHALATION) EVERY 6 HOURS PRN
Qty: 1 EACH | Refills: 11 | Status: SHIPPED | OUTPATIENT
Start: 2024-03-13

## 2024-03-13 RX ORDER — HYDROXYZINE HYDROCHLORIDE 25 MG/1
25 TABLET, FILM COATED ORAL EVERY 8 HOURS PRN
Qty: 90 TABLET | Refills: 0 | Status: SHIPPED | OUTPATIENT
Start: 2024-03-13 | End: 2024-04-12

## 2024-03-13 ASSESSMENT — ENCOUNTER SYMPTOMS
SHORTNESS OF BREATH: 1
BACK PAIN: 1

## 2024-03-13 NOTE — PROGRESS NOTES
In addition, the patient was counseled regarding the importance of remaining smoke free and/or total smoking cessation.    Also reviewed the following if the patient has Medicare that as of February 10, 2022, Medicare only covers LDCT screening in patients aged 50-77 with at least a 20 pack-year smoking history who currently smoke or have quit in the last 15 years

## 2024-03-14 RX ORDER — FLUTICASONE PROPIONATE AND SALMETEROL 500; 50 UG/1; UG/1
1 POWDER RESPIRATORY (INHALATION) 2 TIMES DAILY
Qty: 60 EACH | Refills: 11 | Status: SHIPPED | OUTPATIENT
Start: 2024-03-14

## 2024-04-01 DIAGNOSIS — Z87.891 PERSONAL HISTORY OF TOBACCO USE: ICD-10-CM

## 2024-04-03 ENCOUNTER — HOSPITAL ENCOUNTER (OUTPATIENT)
Dept: CT IMAGING | Age: 69
Discharge: HOME | End: 2024-04-03
Attending: RADIOLOGY

## 2024-04-03 DIAGNOSIS — Z00.6 EXAMINATION FOR NORMAL COMPARISON FOR CLINICAL RESEARCH: ICD-10-CM

## 2024-07-17 ENCOUNTER — TELEPHONE (OUTPATIENT)
Dept: PULMONOLOGY | Age: 69
End: 2024-07-17

## 2024-07-17 NOTE — TELEPHONE ENCOUNTER
Unfortunately we are unable to do pulmonary appointments virtually due to the providers needing to listen to their lungs and evaluate them and that is not able to be done over the phone.     Attempted to call and advise wife. Unable to leave voicemail, phone rang busy.

## 2024-07-17 NOTE — TELEPHONE ENCOUNTER
Patients spouse Bess (on HIPAA) calling in to cancel his appt scheduled for today 7/17/2024 for his copd follow up.  Bess said he is just not very mobile anymore and can't really make it in for appts.  She is asking if they can do a virtual or phone appt?  Please call Bess to advise.

## 2025-08-14 ENCOUNTER — TELEPHONE (OUTPATIENT)
Dept: PULMONOLOGY | Age: 70
End: 2025-08-14